# Patient Record
Sex: FEMALE | Race: WHITE | NOT HISPANIC OR LATINO | Employment: FULL TIME | URBAN - METROPOLITAN AREA
[De-identification: names, ages, dates, MRNs, and addresses within clinical notes are randomized per-mention and may not be internally consistent; named-entity substitution may affect disease eponyms.]

---

## 2020-10-15 ENCOUNTER — APPOINTMENT (OUTPATIENT)
Dept: URGENT CARE | Facility: CLINIC | Age: 25
End: 2020-10-15

## 2020-10-15 DIAGNOSIS — U07.1 COVID-19: ICD-10-CM

## 2020-10-15 DIAGNOSIS — Z02.1 PRE-EMPLOYMENT HEALTH SCREENING EXAMINATION: Primary | ICD-10-CM

## 2020-10-15 PROCEDURE — 86480 TB TEST CELL IMMUN MEASURE: CPT | Performed by: FAMILY MEDICINE

## 2020-10-19 LAB
GAMMA INTERFERON BACKGROUND BLD IA-ACNC: 0.01 IU/ML
M TB IFN-G BLD-IMP: NEGATIVE
M TB IFN-G CD4+ BCKGRND COR BLD-ACNC: 0.01 IU/ML
M TB IFN-G CD4+ BCKGRND COR BLD-ACNC: 0.01 IU/ML
MITOGEN IGNF BCKGRD COR BLD-ACNC: 6.83 IU/ML

## 2021-02-13 DIAGNOSIS — U07.1 COVID-19: Primary | ICD-10-CM

## 2021-02-13 PROCEDURE — 87635 SARS-COV-2 COVID-19 AMP PRB: CPT | Performed by: FAMILY MEDICINE

## 2021-02-14 LAB — SARS-COV-2 RNA RESP QL NAA+PROBE: NEGATIVE

## 2025-03-06 ENCOUNTER — OFFICE VISIT (OUTPATIENT)
Dept: ENDOCRINOLOGY | Facility: HOSPITAL | Age: 30
End: 2025-03-06
Payer: COMMERCIAL

## 2025-03-06 VITALS
BODY MASS INDEX: 27.36 KG/M2 | WEIGHT: 164.2 LBS | HEART RATE: 70 BPM | DIASTOLIC BLOOD PRESSURE: 80 MMHG | SYSTOLIC BLOOD PRESSURE: 120 MMHG | HEIGHT: 65 IN

## 2025-03-06 DIAGNOSIS — R00.2 PALPITATIONS: ICD-10-CM

## 2025-03-06 DIAGNOSIS — R79.89 ELEVATED MORNING SERUM CORTISOL LEVEL: Primary | ICD-10-CM

## 2025-03-06 PROCEDURE — 99204 OFFICE O/P NEW MOD 45 MIN: CPT | Performed by: INTERNAL MEDICINE

## 2025-03-06 RX ORDER — NORGESTIMATE AND ETHINYL ESTRADIOL 7DAYSX3 28
1 KIT ORAL DAILY
COMMUNITY
Start: 2024-12-31

## 2025-03-06 RX ORDER — CITALOPRAM HYDROBROMIDE 20 MG/1
20 TABLET ORAL DAILY
COMMUNITY
Start: 2025-02-02

## 2025-03-06 NOTE — PROGRESS NOTES
3/9/2025    Assessment & Plan      Diagnoses and all orders for this visit:    Elevated morning serum cortisol level  -     Cortisol Level,7-9 AM Specimen  -     T4, free  -     TSH, 3rd generation  -     Cortisol, Free Dialysis, LCMS  -     Catecholamines, fractionated, plasma  -     Metanephrine, Fractionated Plasma Free  -     Vitamin D 25 hydroxy    Palpitations  -     Cortisol Level,7-9 AM Specimen  -     T4, free  -     TSH, 3rd generation  -     Cortisol, Free Dialysis, LCMS  -     Catecholamines, fractionated, plasma  -     Metanephrine, Fractionated Plasma Free  -     Vitamin D 25 hydroxy    Other orders  -     norgestimate-ethinyl estradiol (ORTHO TRI-CYCLEN,TRINESSA) 0.18/0.215/0.25 MG-35 MCG per tablet; Take 1 tablet by mouth in the morning  -     citalopram (CeleXA) 20 mg tablet; Take 20 mg by mouth daily        Assessment & Plan  1. Elevated cortisol levels.  Her elevated cortisol levels may be influenced by her use of birth control pills, which can cause a false elevation of total cortisol levels given an increase in cortisol binding globulin and the free cortisol levels are actually normal.  The fact that she had a 24-hour urinary free cortisol with that was normal does point towards this possibility as the cause of her elevated total cortisol level in the morning. The possibility of POTS was discussed, given her symptoms of increased heart rate and low blood pressure. Anxiety was also considered as a potential contributing factor. She was informed that stress could transiently elevate cortisol levels. She was also informed that caffeine intake could potentially elevate heart rate, but her current consumption is minimal and unlikely to be the primary cause. She was advised to consider discontinuing her birth control pills to assess their impact on her symptoms. A fasting cortisol level test will be conducted, including both total and free cortisol measurements. Thyroid levels will be rechecked,  including TSH and free T4. Adrenaline hormones, catecholamines, and metanephrines will also be assessed. A vitamin D level test will be ordered. All tests should be performed while fasting and between 7:00 AM and 9:00 AM. If any abnormalities are detected, further investigations such as 24-hour urine collection or saliva cortisol level assessment may be necessary.    2.  Palpitation.  Caffeine can worsen palpitations.  POTS could be a consideration.  I will rule out certain hormonal causes of palpitations including thyroid disease and elevated catecholamines.    I have asked her to get blood work drawn fasting between 7 and 9 AM consisting of a cortisol level with free cortisol level, catecholamines, metanephrines, TSH, free T4, and 25-hydroxy vitamin D level.    Follow-up will be determined based on the studies.    I have spent a total time of 55 minutes in caring for this patient on the day of the visit/encounter including Diagnostic results, Prognosis, Risks and benefits of tx options, Instructions for management, Patient and family education, Importance of tx compliance, Risk factor reductions, Impressions, Counseling / Coordination of care, Documenting in the medical record, Reviewing/placing orders in the medical record (including tests, medications, and/or procedures), and Obtaining or reviewing history  .      CC: Cortisol/adrenal consult    History of Present Illness    HPI: Esther Tafoya is a 29-year-old female here for consultation regarding elevated cortisol.    Approximately 2 years ago, she experienced a severe illness at [employer], characterized by near syncope and cardiac symptoms, which she attributes to heat exhaustion. Despite not fainting, she reported persistent malaise throughout the week, culminating in an episode of full-body tremors and numbness from the neck down during her return flight. Initial medical consultation suggested anxiety as the cause of her symptoms. However, she  subsequently required 2 emergency room visits within the same week. Cardiological evaluation revealed a mycoplasma bacterial infection, initially suspected to be walking pneumonia, and significantly elevated cortisol levels (37). Further cardiac monitoring identified frequent PVCs and SVT, leading to the initiation of propranolol therapy. Her blood pressure typically measures around 120/80, but she reports feeling hypotensive, with a recent reading of 90/70. She was also prescribed doxycycline and Lexapro due to persistently high cortisol levels. A 24-hour urine test ruled out Cushing's disease. She discontinued Lexapro after 9 months due to symptom resolution. However, she experienced a recurrence of full-body tremors in 08/2024, which she attributed to stress. She sought medical attention in 10/2024 for tachycardia, particularly during menstruation. She was advised to monitor her symptoms for a month. Concurrently, she was prescribed Medrol for foot pain by a podiatrist. Following a 6-day course of Medrol, she experienced morning dyspnea and tachycardia, with heart rates ranging from 130 to 170. Repeat cortisol levels were elevated (36), but thyroid function was normal. She initiated Celexa therapy, which improved her symptoms. She has never had her cortisol checked on a normal day. She has been on birth control pills since college and has expressed concerns about their potential impact on her symptoms.     She has not been evaluated for postural orthostatic tachycardia syndrome (POTS). She reports no bright pink stretch marks, easy bruising, or poor wound healing. She experiences epigastric pain, which she believes is related to birth control pills. She has gained approximately 10 pounds over the past 3 years. She reports no leg swelling, salt cravings, or tremors. She reports difficulty regulating her body temperature, often feeling cold at night and waking up sweating. She reports feeling tired a lot. She  reports no dry skin, but notes recent improvements in nail strength and generalized hair thinning. She reports no hirsutism on the chin, mid chest, or mid belly, or acne. She has regular menstrual cycles on birth control pills, but feels more anxious and tachycardic during menstruation. She feels more energetic when not taking her birth control pills. She has expressed interest in discontinuing birth control pills to assess their impact on her symptoms.  She reports no family history of cortisol issues. She has no children and does not vape, smoke, or use illicit drugs. She consumes alcohol occasionally, but does not believe it triggers her symptoms. She maintains a healthy diet, walks daily, and drinks plenty of water. She consumes Diet Coke a few times a week.        Historical Information   History reviewed. No pertinent past medical history.  Past Surgical History:   Procedure Laterality Date    WISDOM TOOTH EXTRACTION       Social History   Social History     Substance and Sexual Activity   Alcohol Use Yes    Comment: rare     Social History     Substance and Sexual Activity   Drug Use Never     Social History     Tobacco Use   Smoking Status Never    Passive exposure: Never   Smokeless Tobacco Never     Family History:   Family History   Problem Relation Age of Onset    Deep vein thrombosis Mother         post trauma    Hyperlipidemia Father     Hypertension Father     Mental illness Father         post divorce    Hyperlipidemia Sister     Panic disorder Sister         stress induced    Hearing loss Sister        Meds/Allergies   Current Outpatient Medications   Medication Sig Dispense Refill    citalopram (CeleXA) 20 mg tablet Take 20 mg by mouth daily      norgestimate-ethinyl estradiol (ORTHO TRI-CYCLEN,TRINESSA) 0.18/0.215/0.25 MG-35 MCG per tablet Take 1 tablet by mouth in the morning       No current facility-administered medications for this visit.     No Known Allergies    Objective   Vitals: Blood  "pressure 120/80, pulse 70, height 5' 5\" (1.651 m), weight 74.5 kg (164 lb 3.2 oz).  Invasive Devices       None                   Physical Exam    No lid lag, stare, proptosis, or periorbital edema. No moon facies. No hirsutism on the chin.  Thyroid is normal in size. No palpable thyroid nodules, no bruits over the thyroid gland, no lymphadenopathy. No supraclavicular fat pad or buffalo hump.  Lungs are clear to auscultation.  Heart has a regular rate and rhythm. No murmurs.  No CVA tenderness.  No tremor of the outstretched hands. No lower extremity edema. Patellar deep tendon reflexes are normal.  No violaceous striae.      The history was obtained from the review of the chart and from the patient.    Lab Results:      Blood work performed on 9/25/2023 at 8 AM showed a cortisol total of 37.    A repeat total cortisol and 12/5/2020 4 in the morning was 36.2.    24-hour urine cortisol on 10/18/2023 was 38 which was normal.    Overnight dexamethasone suppression test on 10/4/2023 did show a dexamethasone level of 263 with an a.m. cortisol of 5.2 which was a total cortisol.    TSH on 12/5/2024 was 1.81.    No future appointments.  "

## 2025-03-31 ENCOUNTER — TELEPHONE (OUTPATIENT)
Age: 30
End: 2025-03-31

## 2025-03-31 DIAGNOSIS — R79.89 ELEVATED MORNING SERUM CORTISOL LEVEL: Primary | ICD-10-CM

## 2025-03-31 LAB
25(OH)D3+25(OH)D2 SERPL-MCNC: 37.3 NG/ML (ref 30–100)
CORTIS AM PEAK SERPL-MCNC: 43.5 UG/DL (ref 6.2–19.4)
CORTIS F SERPL-MCNC: 2.18 UG/DL
DOPAMINE 24H UR-MRATE: <30 PG/ML (ref 0–48)
EPINEPH PLAS-MCNC: 25 PG/ML (ref 0–62)
METANEPH FREE SERPL-MCNC: <25 PG/ML (ref 0–88)
NOREPINEPH PLAS-MCNC: 235 PG/ML (ref 0–874)
NORMETANEPHRINE SERPL-MCNC: <25 PG/ML (ref 0–210.1)
T4 FREE SERPL-MCNC: 0.96 NG/DL (ref 0.82–1.77)
TSH SERPL DL<=0.005 MIU/L-ACNC: 1.86 UIU/ML (ref 0.45–4.5)

## 2025-03-31 NOTE — TELEPHONE ENCOUNTER
Her cortisol levels were still elevated with elevated free cortisol levels. I will order a 24 hour urine free cortisol collection and 3 bedtime saliva cortisol levels to be done on 3 separate nights.

## 2025-03-31 NOTE — TELEPHONE ENCOUNTER
Patient calling in to review lab results from 3/19/25. States she is concerned with the cortisol level. Please advise, patient is requesting a call back to discuss. Thank you

## 2025-04-01 ENCOUNTER — APPOINTMENT (OUTPATIENT)
Dept: LAB | Facility: HOSPITAL | Age: 30
End: 2025-04-01

## 2025-04-01 NOTE — TELEPHONE ENCOUNTER
Pt called to get information if the labs that were completed on 03/19 were reviewed. Aware that the provider ordered additional labs for her to complete. Nearest lab location and hours were provided to pt. Pt had no further questions at this time.

## 2025-04-06 ENCOUNTER — APPOINTMENT (OUTPATIENT)
Dept: LAB | Facility: HOSPITAL | Age: 30
End: 2025-04-06
Attending: INTERNAL MEDICINE
Payer: COMMERCIAL

## 2025-04-06 DIAGNOSIS — R00.2 PALPITATIONS: ICD-10-CM

## 2025-04-06 DIAGNOSIS — R79.89 ELEVATED MORNING SERUM CORTISOL LEVEL: ICD-10-CM

## 2025-04-06 DIAGNOSIS — R79.89 HYPOURICEMIA: Primary | ICD-10-CM

## 2025-04-06 LAB
CREAT 24H UR-MRATE: 1 G/24HR (ref 0.6–1.8)
SPECIMEN VOL UR: 2500 ML

## 2025-04-06 PROCEDURE — 82533 TOTAL CORTISOL: CPT

## 2025-04-06 PROCEDURE — 36415 COLL VENOUS BLD VENIPUNCTURE: CPT

## 2025-04-06 PROCEDURE — 82530 CORTISOL FREE: CPT

## 2025-04-06 PROCEDURE — 82570 ASSAY OF URINE CREATININE: CPT

## 2025-04-07 ENCOUNTER — TRANSCRIBE ORDERS (OUTPATIENT)
Dept: LAB | Facility: HOSPITAL | Age: 30
End: 2025-04-07

## 2025-04-07 DIAGNOSIS — R79.89 ELEVATED MORNING SERUM CORTISOL LEVEL: Primary | ICD-10-CM

## 2025-04-07 DIAGNOSIS — R00.2 PALPITATIONS: ICD-10-CM

## 2025-04-08 ENCOUNTER — TELEPHONE (OUTPATIENT)
Age: 30
End: 2025-04-08

## 2025-04-08 NOTE — TELEPHONE ENCOUNTER
Patient calling for lab results from 4/6, informed her we are still waiting on 2 results to come back. She is requesting a call once labs are resulted.  DEVON

## 2025-04-09 NOTE — TELEPHONE ENCOUNTER
Patient calling looking for her results. I advised her they are still in process and once we have the results and the doctor comments to them we can call her with the results.    [Adequate] : adequate

## 2025-04-11 LAB
CORTIS F 24H UR-MRATE: 48 UG/24 HR (ref 6–42)
CORTIS F UR-MCNC: 19 UG/L

## 2025-04-12 LAB
CORTIS BS SAL-MCNC: 0.06 UG/DL
CORTIS SP1 P CHAL SAL-MCNC: 0.04 UG/DL
CORTIS SP2 P CHAL SAL-MCNC: 0.08 UG/DL

## 2025-04-14 NOTE — TELEPHONE ENCOUNTER
Patient called in said that's he saw more results were back on her Mychart.  Please advise and call patient.

## 2025-04-30 DIAGNOSIS — R82.998 ABNORMAL 24 HOUR URINARY CORTISOL MEASUREMENT: ICD-10-CM

## 2025-04-30 DIAGNOSIS — R00.2 PALPITATIONS: ICD-10-CM

## 2025-04-30 DIAGNOSIS — R79.89 ELEVATED MORNING SERUM CORTISOL LEVEL: Primary | ICD-10-CM

## 2025-05-01 ENCOUNTER — TELEPHONE (OUTPATIENT)
Age: 30
End: 2025-05-01

## 2025-05-01 NOTE — TELEPHONE ENCOUNTER
Patient called to schedule follow up appointment, scheduled her to 12/29 as she has time off that week. She will get her labs and urine studies sometime at the end of October or November. Patient is asking if all of the lab orders can be mailed to her home address.